# Patient Record
Sex: FEMALE | Race: AMERICAN INDIAN OR ALASKA NATIVE
[De-identification: names, ages, dates, MRNs, and addresses within clinical notes are randomized per-mention and may not be internally consistent; named-entity substitution may affect disease eponyms.]

---

## 2017-04-02 NOTE — XRAY REPORT
FINAL REPORT



EXAM:  XR ANKLE 2V LT



HISTORY:  Injury, pain, swelling 



COMPARISON:  None available. 



FINDINGS:  

Three views of left ankle obtained. Oblique nondisplaced fracture

of the distal fibular metaphysis and diaphysis. 1 millimeter

avulsive fracture fragment the inferior margin the medial

malleolus. Prominent soft tissue swelling along the lateral

malleolus. Ankle mortise is preserved. 



IMPRESSION:  

Oblique nondisplaced fracture of the distal fibula. 1 millimeter

fracture fragment at the inferior margin of the medial malleolus.

## 2017-04-02 NOTE — EMERGENCY DEPARTMENT REPORT
HPI





- General


Chief Complaint: Extremity Injury, Lower


Time Seen by Provider: 04/02/17 19:56





- HPI


HPI: 





21-year-old -American female comes in for left ankle pain.  Patient 

states that she slipped and injured her left ankle she states she heard a 

cracking noise the injury happened last night.  She does report that she iced 

it and take ibuprofen and elevated it last night.  She has no past medical 

history she currently is on no medication and she has a Mirena placed.  Last 

menstrual period was 03/20/2017.





ED Past Medical Hx





- Past Medical History


Previous Medical History?: Yes


Additional medical history: Pregnancy





- Surgical History


Past Surgical History?: No





- Social History


Smoking Status: Current Every Day Smoker


Substance Use Type: None





- Medications


Home Medications: 


 Home Medications











 Medication  Instructions  Recorded  Confirmed  Last Taken  Type


 


Famotidine [Pepcid] 20 mg PO BID #60 tablet 07/03/16  Unknown Rx


 


Prenatal Vit No.130/Iron/FA 1 tab PO DAILY 07/03/16 07/03/16 11/08/16 History





[Prenatal Tablet]     


 


Oxycodone HCl/Acetaminophen 1 each PO Q6HR PRN #12 tablet 04/02/17  Unknown Rx





[Percocet 7.5/325 mg]     














ED Review of Systems


ROS: 


Stated complaint: LEFT ANKLE SWOLLEN


Other details as noted in HPI





Musculoskeletal: joint swelling, arthralgia





Physical Exam





- Physical Exam


Vital Signs: 


 Vital Signs











  04/02/17





  17:51


 


Temperature 99.1 F


 


Pulse Rate 100 H


 


Respiratory 20





Rate 


 


Blood Pressure 143/76


 


O2 Sat by Pulse 100





Oximetry 











Physical Exam: 





GENERAL: Alert and oriented x3, no apparent distress, Normal Gait, atraumatic. 


HEAD: Head is normocephalic and a-traumatic. 


EYES: Extra ocular muscles are intact. Pupils are equal, round, and reactive to 

light and accommodation. 


EXTREMITIES/MUSCULOSKELETAL: No cyanosis, clubbing, rash, lesions or edema. 

Full ROM bilaterally. UE/LE Pulses 2+ bilaterally. LE and UE 5+ strength 

bilaterally left leg edema to the lateral and posterior malleolus.  Tender to 

palpate, decreased range of motion secondary to pain.  Capillary refill is 

intact less than 2 seconds.


NEUROLOGIC: No focal Deficit, Cranial nerves II through XII are grossly intact. 

No loss of sensation, No facial droop, 


PSYCHIATRIC: Mood is congruent with affect, denies suicidal or homicidal 

ideations. 


SKIN: Warm and dry, No lesions, No ulceration or induration present








ED Course


 Vital Signs











  04/02/17





  17:51


 


Temperature 99.1 F


 


Pulse Rate 100 H


 


Respiratory 20





Rate 


 


Blood Pressure 143/76


 


O2 Sat by Pulse 100





Oximetry 














ED Medical Decision Making





- Medical Decision Making





Patient seen and evaluated by this provider fast track.  Given patient a 

Percocet 2 tablets by mouth now.  She has fractured her fibula as a oblique 

nondisplaced fracture of the distal tubular is a 1 mm fracture fragment at the 

inferior margin of the medial malleolus.  The patient a posterior splint and 

refer her to orthopedics and would give her pain management.  Patient 

verbalized understanding


Critical care attestation.: 


If time is entered above; I have spent that time in minutes in the direct care 

of this critically ill patient, excluding procedure time.








ED Disposition


Clinical Impression: 


 Fracture, fibula, proximal, Fracture of malleolus of left ankle





Disposition: DISCHARGED TO HOME OR SELFCARE


Is pt being admited?: No


Does the pt Need Aspirin: No


Condition: Stable


Instructions:  Ankle Fracture (ED)


Additional Instructions: 


Please take pain medication as prescribed.  Do not operate heavy machinery 

while on this medication.  Is very important for you to follow up with 

orthopedic for proper treatment of the ureter ankle.


Prescriptions: 


Oxycodone HCl/Acetaminophen [Percocet 7.5/325 mg] 1 each PO Q6HR PRN #12 tablet


 PRN Reason: Pain


Referrals: 


DOMINIC CLIFFORD MD [Staff Physician] - 3-5 Days


[facility] [Provider Group] - 3-5 Days


[facility] [Provider Group] - 3-5 Days


[facility] [place],  [Provider Group] - 3-5 Days


[facility] [Provider Group] - 3-5 Days


Forms:  Work/School Release Form(ED)

## 2017-08-21 ENCOUNTER — HOSPITAL ENCOUNTER (EMERGENCY)
Dept: HOSPITAL 5 - ED | Age: 22
Discharge: HOME | End: 2017-08-21
Payer: COMMERCIAL

## 2017-08-21 VITALS — SYSTOLIC BLOOD PRESSURE: 159 MMHG | DIASTOLIC BLOOD PRESSURE: 83 MMHG

## 2017-08-21 DIAGNOSIS — N76.0: ICD-10-CM

## 2017-08-21 DIAGNOSIS — N30.00: Primary | ICD-10-CM

## 2017-08-21 LAB
BILIRUB UR QL STRIP: (no result)
BLOOD UR QL VISUAL: (no result)
KETONES UR STRIP-MCNC: (no result) MG/DL
LEUKOCYTE ESTERASE UR QL STRIP: (no result)
MUCOUS THREADS #/AREA URNS HPF: (no result) /HPF
NITRITE UR QL STRIP: (no result)
PH UR STRIP: 7 [PH] (ref 5–7)
RBC #/AREA URNS HPF: 2 /HPF (ref 0–6)
UROBILINOGEN UR-MCNC: 2 MG/DL (ref ?–2)
WBC #/AREA URNS HPF: 1 /HPF (ref 0–6)

## 2017-08-21 PROCEDURE — 87591 N.GONORRHOEAE DNA AMP PROB: CPT

## 2017-08-21 PROCEDURE — 81001 URINALYSIS AUTO W/SCOPE: CPT

## 2017-08-21 PROCEDURE — 87210 SMEAR WET MOUNT SALINE/INK: CPT

## 2017-08-21 PROCEDURE — 81025 URINE PREGNANCY TEST: CPT

## 2017-08-21 NOTE — EMERGENCY DEPARTMENT REPORT
ED Female  HPI





- General


Chief complaint: Urogenital-Female


Stated complaint: ABD PAIN/BIRTH CONTROL


Time Seen by Provider: 08/21/17 17:57


Source: patient


Mode of arrival: Ambulatory


Limitations: No Limitations





- History of Present Illness


Initial comments: 





Patient is a 21-year-old female presents to the ED complaining of vaginal 

discharge with foul odor times one week.  Patient describes discharge as white, 

creamy consistency. She denies any vaginal lesions or pain. She states last 

menstrual period was last month.  She also states that her Mirena strings is 

causing her discomfort and causing her pain in her vaginal area patient states 

she can feel it would service dates painful.  Patient states she is urinary and 

since the beginning of the year.  Patient states he was placed in January.  

Patient states like to have it removed because it is causing her pain. patient 

states she does not have OB/GYN at this point.


Patient denies any fever, chills, nausea or vomiting vaginal itching, dysuria, 

vaginal bleeding.


MD Complaint: vaginal discharge, dysuria





- Related Data


 Home Medications











 Medication  Instructions  Recorded  Confirmed  Last Taken


 


Prenatal Vit No.130/Iron/FA 1 tab PO DAILY 07/03/16 07/03/16 11/08/16





[Prenatal Tablet]    








 Previous Rx's











 Medication  Instructions  Recorded  Last Taken  Type


 


Famotidine [Pepcid] 20 mg PO BID #60 tablet 07/03/16 Unknown Rx


 


Oxycodone HCl/Acetaminophen 1 each PO Q6HR PRN #12 tablet 04/02/17 Unknown Rx





[Percocet 7.5/325 mg]    


 


Norgestimate-Ethinyl Estradiol 1 each PO DAILY #28 tablet 08/21/17 Unknown Rx





[Sprintec 28 Day Tablet]    


 


Phenazopyridine [Pyridium] 200 mg PO BID #10 tab 08/21/17 Unknown Rx


 


Sulfamethoxazole/Trimethoprim 1 each PO BID #14 tablet 08/21/17 Unknown Rx





[Bactrim DS TAB]    











 Allergies











Allergy/AdvReac Type Severity Reaction Status Date / Time


 


No Known Allergies Allergy   Unverified 01/16/16 17:46














ED Review of Systems


ROS: 


Stated complaint: ABD PAIN/BIRTH CONTROL


Other details as noted in HPI





Constitutional: denies: chills, fever


Eyes: denies: eye pain, eye discharge, vision change


ENT: denies: ear pain, throat pain


Respiratory: denies: cough, shortness of breath, wheezing


Cardiovascular: denies: chest pain, palpitations


Endocrine: no symptoms reported


Gastrointestinal: denies: abdominal pain, nausea, diarrhea


Genitourinary: denies: urgency, dysuria, discharge


Musculoskeletal: denies: back pain, joint swelling, arthralgia


Skin: denies: rash, lesions


Neurological: denies: headache, weakness, paresthesias


Psychiatric: denies: anxiety, depression


Hematological/Lymphatic: denies: easy bleeding, easy bruising





ED Past Medical Hx





- Past Medical History


Previous Medical History?: No


Additional medical history: Pregnancy





- Surgical History


Past Surgical History?: No





- Social History


Smoking Status: Never Smoker


Substance Use Type: None





- Medications


Home Medications: 


 Home Medications











 Medication  Instructions  Recorded  Confirmed  Last Taken  Type


 


Famotidine [Pepcid] 20 mg PO BID #60 tablet 07/03/16  Unknown Rx


 


Prenatal Vit No.130/Iron/FA 1 tab PO DAILY 07/03/16 07/03/16 11/08/16 History





[Prenatal Tablet]     


 


Oxycodone HCl/Acetaminophen 1 each PO Q6HR PRN #12 tablet 04/02/17  Unknown Rx





[Percocet 7.5/325 mg]     


 


Norgestimate-Ethinyl Estradiol 1 each PO DAILY #28 tablet 08/21/17  Unknown Rx





[Sprintec 28 Day Tablet]     


 


Phenazopyridine [Pyridium] 200 mg PO BID #10 tab 08/21/17  Unknown Rx


 


Sulfamethoxazole/Trimethoprim 1 each PO BID #14 tablet 08/21/17  Unknown Rx





[Bactrim DS TAB]     














ED Physical Exam





- General


Limitations: No Limitations


General appearance: alert, in no apparent distress





- Head


Head exam: Present: atraumatic, normocephalic





- Eye


Eye exam: Present: normal appearance





- ENT


ENT exam: Present: mucous membranes moist





- Neck


Neck exam: Present: normal inspection





- Respiratory


Respiratory exam: Present: normal lung sounds bilaterally.  Absent: respiratory 

distress





- Cardiovascular


Cardiovascular Exam: Present: regular rate, normal rhythm.  Absent: systolic 

murmur, diastolic murmur, rubs, gallop





- GI/Abdominal


GI/Abdominal exam: Present: soft, normal bowel sounds





- 


External exam: Present: normal external exam.  Absent: erythema, swelling, 

lesions, lacerations, bleeding


Speculum exam: Present: normal speculum exam, vaginal discharge, other (Mirena 

strings visualized and mirena removed without any complications per pt request)

.  Absent: vaginal bleeding, foreign body, laceration


Bi-manual exam: Present: normal bi-manual exam.  Absent: cervical motion 

tendernes





- Extremities Exam


Extremities exam: Present: normal inspection





- Back Exam


Back exam: Present: normal inspection





- Neurological Exam


Neurological exam: Present: alert, oriented X3





- Psychiatric


Psychiatric exam: Present: normal affect, normal mood





- Skin


Skin exam: Present: warm, dry, intact, normal color.  Absent: rash





ED Course


 Vital Signs











  08/21/17 08/21/17





  17:59 22:58


 


Temperature 98.6 F 


 


Pulse Rate 90 80


 


Respiratory 16 18





Rate  


 


Blood Pressure 159/102 


 


Blood Pressure  159/83





[Left]  


 


O2 Sat by Pulse 100 100





Oximetry  














ED Medical Decision Making





- Medical Decision Making


21-year-old female presents with UTI/bacterial vaginosis


ED course: Urinalysis, wet prep, Chlamydia and gonorrhea sent.


Urinalysis positive for leukorrhea


Discussed findings with patient.


Discussed the patient needs to follow up with gynecologist for further STD 

testing as well as a normal screening


Discussed the patient results for chlamydia and gonorrhea will not come back in 

3 days for results.


Vital signs are normal patient is in no acute distress


Discussed STDs not excluded and need to follow-up for the results.


Discuss daily conscious her to use of Sprintec for coverage since urine was 

removed today.


Patient is alert and oriented 3 show no signs of instructions given and states 

will follow-up.








Critical care attestation.: 


If time is entered above; I have spent that time in minutes in the direct care 

of this critically ill patient, excluding procedure time.








ED Disposition


Clinical Impression: 


 Bacterial vaginosis





UTI (urinary tract infection)


Qualifiers:


 Urinary tract infection type: acute cystitis Hematuria presence: without 

hematuria Qualified Code(s): N30.00 - Acute cystitis without hematuria





Disposition: DC-01 TO HOME OR SELFCARE


Is pt being admited?: No


Does the pt Need Aspirin: No


Condition: Stable


Instructions:  Bacterial Vaginosis (ED), Urinary Tract Infection in Women (ED)


Prescriptions: 


Norgestimate-Ethinyl Estradiol [Sprintec 28 Day Tablet] 1 each PO DAILY #28 

tablet


Phenazopyridine [Pyridium] 200 mg PO BID #10 tab


Sulfamethoxazole/Trimethoprim [Bactrim DS TAB] 1 each PO BID #14 tablet


Referrals: 


PRIMARY CARE,MD [Primary Care Provider] - 3-5 Days


LORI SHAFER MD [Referring] - 3-5 Days


[facility] [Outside] - 3-5 Days


Forms:  Accompanied Note, STI Treatment and Prevention, Work/School Release Form

(ED)


Time of Disposition: 22:33

## 2017-08-21 NOTE — EMERGENCY DEPARTMENT REPORT
Chief Complaint: Abdominal Pain


Stated Complaint: ABD PAIN/BIRTH CONTROL


Time Seen by Provider: 08/21/17 17:57





- HPI


History of Present Illness: 





PT c/o abd pain x 2 weeks.  PT states she thinks it is due to her Mirena.  PT 

states she has had the Mirena in place for 9 months. 





- ROS


Review of Systems: 





+ vaginal discharge- creamy, white 





- Exam


Physical Exam: 





obese pt


steady gait 


gu exam not done in triage 


MSE screening note: 


Focused history and physical exam performed.


Due to findings the following was ordered:





labs 





ED Disposition for MSE


Condition: Stable

## 2017-09-08 ENCOUNTER — HOSPITAL ENCOUNTER (EMERGENCY)
Dept: HOSPITAL 5 - ED | Age: 22
LOS: 1 days | Discharge: HOME | End: 2017-09-09
Payer: COMMERCIAL

## 2017-09-08 DIAGNOSIS — M25.572: Primary | ICD-10-CM
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
## 2017-09-08 DIAGNOSIS — M54.2: ICD-10-CM

## 2017-09-08 PROCEDURE — 99284 EMERGENCY DEPT VISIT MOD MDM: CPT

## 2017-09-08 PROCEDURE — 36415 COLL VENOUS BLD VENIPUNCTURE: CPT

## 2017-09-08 PROCEDURE — 84703 CHORIONIC GONADOTROPIN ASSAY: CPT

## 2017-09-08 PROCEDURE — 72040 X-RAY EXAM NECK SPINE 2-3 VW: CPT

## 2017-09-09 VITALS — DIASTOLIC BLOOD PRESSURE: 94 MMHG | SYSTOLIC BLOOD PRESSURE: 153 MMHG

## 2017-09-09 NOTE — EMERGENCY DEPARTMENT REPORT
ED Fall HPI





- General


Chief Complaint: Fall


Stated Complaint: FELL DOWN ON THE FLOOR


Time Seen by Provider: 09/09/17 02:41


Source: patient


Mode of arrival: Ambulatory





- History of Present Illness


Initial Comments: 


21-year-old female past medical history obesity presents with complaint of left 

ankle pain and neck pain status post mechanical fall while shopping in store 

this evening.  Patient states that she was shopping in a store and states that 

she slipped on an area of the floor that she claims was slippery in store.  

Patient states she fell backwards denies any loss of consciousness denies 

sustaining any lacerations.  Patient states she had to be helped up by 

bystanders.  Brought into the hospital for evaluation by boyfriend.  Patient is 

awake alert and oriented 3 not in acute distress denies chest pain shortness 

of breath nausea vomiting blurry vision and headache.  Fully lucid, cooperative

, calm.  States her left ankle is aching.  It's that the back of her neck feels 

sore.  Denies any other injuries.


MD Complaint: fall


-: Last night


Fall From: standing


Fall Witnessed: yes, by bystander


Place Fall Occurred: other (states it occurred in a store)


Loss of Consciousness: none


Prolonged Down Time?: no, minute(s) (1)


Symptoms Prior to Fall: none


Location - Extremities: Left: Ankle


Severity: mild


Severity scale (0 -10): 4


Quality: aching


Context: tripped/slipped


Associated Symptoms: denies





- Related Data


 Home Medications











 Medication  Instructions  Recorded  Confirmed  Last Taken


 


Prenatal Vit No.130/Iron/FA 1 tab PO DAILY 07/03/16 07/03/16 11/08/16





[Prenatal Tablet]    








 Previous Rx's











 Medication  Instructions  Recorded  Last Taken  Type


 


Famotidine [Pepcid] 20 mg PO BID #60 tablet 07/03/16 Unknown Rx


 


Oxycodone HCl/Acetaminophen 1 each PO Q6HR PRN #12 tablet 04/02/17 Unknown Rx





[Percocet 7.5/325 mg]    


 


Norgestimate-Ethinyl Estradiol 1 each PO DAILY #28 tablet 08/21/17 Unknown Rx





[Sprintec 28 Day Tablet]    


 


Phenazopyridine [Pyridium] 200 mg PO BID #10 tab 08/21/17 Unknown Rx


 


Sulfamethoxazole/Trimethoprim 1 each PO BID #14 tablet 08/21/17 Unknown Rx





[Bactrim DS TAB]    


 


Naproxen [Naprosyn TAB] 500 mg PO BID PRN #25 tablet 09/09/17 Unknown Rx











 Allergies











Allergy/AdvReac Type Severity Reaction Status Date / Time


 


No Known Allergies Allergy   Unverified 01/16/16 17:46














ED Review of Systems


ROS: 


Stated complaint: FELL DOWN ON THE FLOOR


Other details as noted in HPI





Constitutional: denies: chills, fever


Eyes: denies: eye pain, eye discharge, vision change


ENT: denies: ear pain, throat pain


Respiratory: denies: cough, shortness of breath, wheezing


Cardiovascular: denies: chest pain, palpitations


Endocrine: no symptoms reported


Gastrointestinal: denies: abdominal pain, nausea, diarrhea


Genitourinary: denies: urgency, dysuria, discharge


Musculoskeletal: denies: back pain, joint swelling, arthralgia


Skin: denies: rash, lesions


Neurological: denies: headache, weakness, paresthesias


Psychiatric: denies: anxiety, depression


Hematological/Lymphatic: denies: easy bleeding, easy bruising





ED Past Medical Hx





- Past Medical History


Previous Medical History?: No


Additional medical history: Pregnancy





- Surgical History


Past Surgical History?: No





- Social History


Smoking Status: Never Smoker


Substance Use Type: None





- Medications


Home Medications: 


 Home Medications











 Medication  Instructions  Recorded  Confirmed  Last Taken  Type


 


Famotidine [Pepcid] 20 mg PO BID #60 tablet 07/03/16  Unknown Rx


 


Prenatal Vit No.130/Iron/FA 1 tab PO DAILY 07/03/16 07/03/16 11/08/16 History





[Prenatal Tablet]     


 


Oxycodone HCl/Acetaminophen 1 each PO Q6HR PRN #12 tablet 04/02/17  Unknown Rx





[Percocet 7.5/325 mg]     


 


Norgestimate-Ethinyl Estradiol 1 each PO DAILY #28 tablet 08/21/17  Unknown Rx





[Sprintec 28 Day Tablet]     


 


Phenazopyridine [Pyridium] 200 mg PO BID #10 tab 08/21/17  Unknown Rx


 


Sulfamethoxazole/Trimethoprim 1 each PO BID #14 tablet 08/21/17  Unknown Rx





[Bactrim DS TAB]     


 


Naproxen [Naprosyn TAB] 500 mg PO BID PRN #25 tablet 09/09/17  Unknown Rx














ED Physical Exam





- General


Limitations: No Limitations


General appearance: alert, in no apparent distress





- Head


Head exam: Present: atraumatic, normocephalic





- Eye


Eye exam: Present: normal appearance, PERRL, EOMI





- ENT


ENT exam: Present: mucous membranes moist





- Neck


Neck exam: Present: normal inspection, full ROM (neck flexion and extension 

intact)





- Respiratory


Respiratory exam: Present: normal lung sounds bilaterally.  Absent: respiratory 

distress





- Cardiovascular


Cardiovascular Exam: Present: regular rate, normal rhythm.  Absent: systolic 

murmur, diastolic murmur, rubs, gallop





- GI/Abdominal


GI/Abdominal exam: Present: soft, normal bowel sounds





- Extremities Exam


Extremities exam: Present: normal inspection





- Expanded Lower Extremity Exam


  ** Left


Lower Leg exam: Present: normal inspection, full ROM


Ankle exam: Present: normal inspection, full ROM (ankle dorsiflexion plantar 

flexion inversion eversion fully intact)


Foot/Toe exam: Present: normal inspection, full ROM


Neuro vascular tendon exam: Present: no vascular compromise (distal dorsalis 

pedis and posterior tibial pulses intact)


Gait: Positive: antalgic





  __________________________














  __________________________





 1 - Some pain on palpation here








- Back Exam


Back exam: Present: normal inspection





- Neurological Exam


Neurological exam: Present: alert, oriented X3, CN II-XII intact, normal gait





- Psychiatric


Psychiatric exam: Present: normal affect, normal mood





- Skin


Skin exam: Present: warm, dry, intact, normal color.  Absent: rash





ED Course





 Vital Signs











  09/08/17





  23:08


 


Temperature 98.5 F


 


Pulse Rate 94 H


 


Respiratory 20





Rate 


 


Blood Pressure 157/81


 


O2 Sat by Pulse 99





Oximetry 














ED Medical Decision Making





- Medical Decision Making


A/P: Fall injury, neck pain, ankle pain


1-patient is able to walk without assistance without any significant difficulty 

and cranial nerves I through XII grossly intact, patient is fully awake alert 

oriented and lucid.  Does not meet [facility] or [place] head CT rules for 

imaging C-spine/head.  X-ray ordered from triage which was negative.


2-ankle x-ray unremarkable area patient is fully ambulatory without assistance


3-naproxen when necessary, RICE therapy, Ace wrap to ankle


4-patient ambulatory upon discharge. follow up with primary care 


Critical care attestation.: 


If time is entered above; I have spent that time in minutes in the direct care 

of this critically ill patient, excluding procedure time.








ED Disposition


Clinical Impression: 


 Neck pain





Ankle pain, left


Qualifiers:


 Chronicity: acute Qualified Code(s): M25.572 - Pain in left ankle and joints 

of left foot





Fall


Qualifiers:


 Encounter type: initial encounter Qualified Code(s): W19.XXXA - Unspecified 

fall, initial encounter





Disposition: DC-01 TO HOME OR SELFCARE


Is pt being admited?: No


Does the pt Need Aspirin: No


Condition: Stable


Instructions:  RICE Therapy (ED), Musculoskeletal Pain (ED), Acute Low Back 

Pain (ED)


Prescriptions: 


Naproxen [Naprosyn TAB] 500 mg PO BID PRN #25 tablet


 PRN Reason: Pain


Referrals: 


[facility] [Provider Group] - 3-5 Days


[facility] [Provider Group] - 3-5 Days


[facility] [Provider Group] - 3-5 Days


Forms:  Accompanied Note, Work/School Release Form(ED)


Time of Disposition: 03:34

## 2017-09-09 NOTE — XRAY REPORT
FINAL REPORT



PROCEDURE:  XR SPINE CERVICAL 2-3V



TECHNIQUE:  Cervical spine radiographs, AP, lateral, and

open-mouth odontoid views. CPT 23658







HISTORY:  NECK/BACK PAIN 



COMPARISON:  No prior studies are available for comparison.



FINDINGS:  

Prevertebral soft tissues: Normal .



Alignment: There is a reversal of liver lordosis, muscle or

ligamentous strain is suspected.



Vertebral body heights/Disk spaces: Normal .



Fracture(s): None .



Facets: Normal .



Bone mineralization: Normal .







IMPRESSION:  

No acute fracture or dislocation of the cervical spine.

Straightening of the cervical lordosis most consistent with

muscle or ligamentous strain.

## 2017-09-09 NOTE — XRAY REPORT
FINAL REPORT



PROCEDURE:  XR ANKLE 2V LT



TECHNIQUE:  LEFT ankle radiographs, AP and lateral views. 



HISTORY:  LEFT ANKLE PAIN 



COMPARISON:  04/02/2017



FINDINGS:  

Fracture (s) and/or Dislocation(s): No acute fracture is

identified. Minimal osseous remodeling of the distal fibula from

previous fracture.



Alignment: Normal.



Joint space(s): Normal.



Soft tissues: Normal.



Bone mineralization: Normal.



Foreign bodies: Normal.



Calcaneal spurring: Normal.







IMPRESSION:  

No evidence of an acute fracture or dislocation..

## 2017-10-14 ENCOUNTER — HOSPITAL ENCOUNTER (EMERGENCY)
Dept: HOSPITAL 5 - ED | Age: 22
LOS: 1 days | Discharge: LEFT BEFORE DISCHARGE | End: 2017-10-15
Payer: COMMERCIAL

## 2017-10-14 DIAGNOSIS — M54.5: Primary | ICD-10-CM

## 2017-10-14 DIAGNOSIS — Z53.21: ICD-10-CM

## 2017-10-14 PROCEDURE — 84702 CHORIONIC GONADOTROPIN TEST: CPT

## 2017-10-14 PROCEDURE — 81001 URINALYSIS AUTO W/SCOPE: CPT

## 2017-10-14 PROCEDURE — 36415 COLL VENOUS BLD VENIPUNCTURE: CPT

## 2017-10-14 PROCEDURE — 81025 URINE PREGNANCY TEST: CPT

## 2017-10-15 VITALS — SYSTOLIC BLOOD PRESSURE: 148 MMHG | DIASTOLIC BLOOD PRESSURE: 87 MMHG

## 2017-10-15 LAB
BACTERIA #/AREA URNS HPF: (no result) /HPF
BILIRUB UR QL STRIP: (no result)
BLOOD UR QL VISUAL: (no result)
KETONES UR STRIP-MCNC: (no result) MG/DL
LEUKOCYTE ESTERASE UR QL STRIP: (no result)
MUCOUS THREADS #/AREA URNS HPF: (no result) /HPF
NITRITE UR QL STRIP: (no result)
PH UR STRIP: 6 [PH] (ref 5–7)
PROT UR STRIP-MCNC: (no result) MG/DL
RBC #/AREA URNS HPF: 7 /HPF (ref 0–6)
UROBILINOGEN UR-MCNC: < 2 MG/DL (ref ?–2)
WBC #/AREA URNS HPF: 7 /HPF (ref 0–6)

## 2017-11-20 ENCOUNTER — HOSPITAL ENCOUNTER (EMERGENCY)
Dept: HOSPITAL 5 - ED | Age: 22
Discharge: HOME | End: 2017-11-20
Payer: MEDICAID

## 2017-11-20 VITALS — SYSTOLIC BLOOD PRESSURE: 112 MMHG | DIASTOLIC BLOOD PRESSURE: 65 MMHG

## 2017-11-20 DIAGNOSIS — O26.891: Primary | ICD-10-CM

## 2017-11-20 DIAGNOSIS — Z3A.11: ICD-10-CM

## 2017-11-20 DIAGNOSIS — N89.8: ICD-10-CM

## 2017-11-20 LAB
ANION GAP SERPL CALC-SCNC: 18 MMOL/L
BACTERIA #/AREA URNS HPF: (no result) /HPF
BILIRUB UR QL STRIP: (no result)
BLOOD UR QL VISUAL: (no result)
BUN SERPL-MCNC: 6 MG/DL (ref 7–17)
BUN/CREAT SERPL: 15 %
CALCIUM SERPL-MCNC: 9.2 MG/DL (ref 8.4–10.2)
CHLORIDE SERPL-SCNC: 100.8 MMOL/L (ref 98–107)
CO2 SERPL-SCNC: 25 MMOL/L (ref 22–30)
GLUCOSE SERPL-MCNC: 96 MG/DL (ref 65–100)
HCT VFR BLD CALC: 37.3 % (ref 30.3–42.9)
HGB BLD-MCNC: 12.3 GM/DL (ref 10.1–14.3)
KETONES UR STRIP-MCNC: (no result) MG/DL
LEUKOCYTE ESTERASE UR QL STRIP: (no result)
MCH RBC QN AUTO: 29 PG (ref 28–32)
MCHC RBC AUTO-ENTMCNC: 33 % (ref 30–34)
MCV RBC AUTO: 89 FL (ref 79–97)
MUCOUS THREADS #/AREA URNS HPF: (no result) /HPF
NITRITE UR QL STRIP: (no result)
PH UR STRIP: 6 [PH] (ref 5–7)
PLATELET # BLD: 330 K/MM3 (ref 140–440)
POTASSIUM SERPL-SCNC: 3.6 MMOL/L (ref 3.6–5)
RBC # BLD AUTO: 4.21 M/MM3 (ref 3.65–5.03)
RBC #/AREA URNS HPF: 9 /HPF (ref 0–6)
SODIUM SERPL-SCNC: 140 MMOL/L (ref 137–145)
UROBILINOGEN UR-MCNC: 2 MG/DL (ref ?–2)
WBC # BLD AUTO: 8.4 K/MM3 (ref 4.5–11)
WBC #/AREA URNS HPF: 5 /HPF (ref 0–6)

## 2017-11-20 PROCEDURE — 85027 COMPLETE CBC AUTOMATED: CPT

## 2017-11-20 PROCEDURE — 76802 OB US < 14 WKS ADDL FETUS: CPT

## 2017-11-20 PROCEDURE — 80048 BASIC METABOLIC PNL TOTAL CA: CPT

## 2017-11-20 PROCEDURE — 84702 CHORIONIC GONADOTROPIN TEST: CPT

## 2017-11-20 PROCEDURE — 81001 URINALYSIS AUTO W/SCOPE: CPT

## 2017-11-20 PROCEDURE — 86900 BLOOD TYPING SEROLOGIC ABO: CPT

## 2017-11-20 PROCEDURE — 76801 OB US < 14 WKS SINGLE FETUS: CPT

## 2017-11-20 PROCEDURE — 36415 COLL VENOUS BLD VENIPUNCTURE: CPT

## 2017-11-20 PROCEDURE — 86901 BLOOD TYPING SEROLOGIC RH(D): CPT

## 2017-11-20 PROCEDURE — 76817 TRANSVAGINAL US OBSTETRIC: CPT

## 2017-11-20 NOTE — ULTRASOUND REPORT
FINAL REPORT



EXAM:  US OB &lt; = 14 WK FETUS ADD GEST



HISTORY:  16 weeks pregnant with no fetal movement felt by the

mother .  LMP 09/22/2017 with estimated age 8 weeks 3 days and

EDC 06/29/2018. A serum beta HCG quantitation [id number] 



TECHNIQUE:  Ultrasound of the pelvis using transabdominal and

transvaginal imaging 



PRIORS:  None.



FINDINGS:  

Uterus: Uterus is enlarged in size and normal and homogeneous in

echogenicity without focal fibroid formation. The uterus measures

12.9 x 6.5 x 7.3 cm in size. There is a single early viable

intrauterine gestation noted. 



Intrauterine gestation: There is a single intrauterine gestation

identified with both a fetal pole and yolk sac. Fetal heart rate

is monitored at 163 BPM using M-mode doppler. Crown-rump length

measurement of 4.26 cm corresponds to estimated age 11 weeks 1

days with EDC 06/10/2018. Placenta is located anteriorly. 



Ovaries: Neither ovary is visualized. 



Other: There is no evidence for solid adnexal mass is seen. There

is no free fluid in the cul-de-sac. 



IMPRESSION:  

Single intrauterine viable  pregnancy with an approximate age of

11 weeks 1 days.

## 2017-11-20 NOTE — EMERGENCY DEPARTMENT REPORT
HPI





- General


Chief Complaint: Urogenital-Female


Time Seen by Provider: 17 14:36





- HPI


HPI: 


This is a 22-year-old  female presents to the emergency 

department with complaint of concern for possible miscarriage as she took some 

indomethacin earlier, that she takes for herpes simplex, and did not feel like 

the baby was moving the way it normally does.  She denies any vaginal bleeding, 

discharge, dysuria, fever, nausea, vomiting or abdominal pain.  She does have a 

little bit of pressure towards the vagina.  With this pregnancy she is .  

She is going to see [facility] but does not have an appointment 

until December.  She believes that she is about 16 weeks pregnant.  She is on 

prenatal vitamins.








ED Past Medical Hx





- Past Medical History


Additional medical history: Pregnancy,herpes





- Surgical History


Past Surgical History?: No





- Social History


Smoking Status: Never Smoker


Substance Use Type: None





- Medications


Home Medications: 


 Home Medications











 Medication  Instructions  Recorded  Confirmed  Last Taken  Type


 


Famotidine [Pepcid] 20 mg PO BID #60 tablet 16  Unknown Rx


 


Prenatal Vit No.130/Iron/Folic 1 tab PO DAILY 16 History





[Prenatal Tablet]     


 


Oxycodone HCl/Acetaminophen 1 each PO Q6HR PRN #12 tablet 17  Unknown Rx





[Percocet 7.5/325 mg]     


 


Norgestimate-Ethinyl Estradiol 1 each PO DAILY #28 tablet 17  Unknown Rx





[Sprintec 28 Day Tablet]     


 


Phenazopyridine [Pyridium] 200 mg PO BID #10 tab 17  Unknown Rx


 


Sulfamethoxazole/Trimethoprim 1 each PO BID #14 tablet 17  Unknown Rx





[Bactrim DS TAB]     


 


Naproxen [Naprosyn TAB] 500 mg PO BID PRN #25 tablet 17  Unknown Rx














ED Review of Systems


ROS: 


Stated complaint: VAGINAL PRESSURE


Other details as noted in HPI





Comment: All other systems reviewed and negative


Constitutional: denies: chills, fever


Eyes: denies: eye pain, eye discharge, vision change


ENT: denies: ear pain, throat pain


Respiratory: denies: cough, shortness of breath, wheezing


Cardiovascular: denies: chest pain, palpitations


Gastrointestinal: denies: abdominal pain, nausea, diarrhea


Genitourinary: denies: urgency, dysuria, discharge


Musculoskeletal: denies: back pain, joint swelling, arthralgia


Skin: denies: change in color, pruritus


Neurological: denies: headache, weakness, paresthesias





Physical Exam





- Physical Exam


Vital Signs: 


 Vital Signs











  17





  13:46 19:28


 


Temperature 98.3 F 97.6 F


 


Pulse Rate 86 87


 


Respiratory 18 18





Rate  


 


Blood Pressure 128/67 112/65


 


O2 Sat by Pulse 99 100





Oximetry  











Physical Exam: 





GENERAL: The patient is well-developed well-nourished.


HENT: Normocephalic.  Atraumatic.    Patient has moist mucous membranes.


EYES: Extraocular motions are intact.  Pupils equal reactive to light 

bilaterally.


NECK: Supple.  Trachea is midline.


CHEST/LUNGS: Clear to auscultation.  There is no respiratory distress noted.


HEART/CARDIOVASCULAR: Regular.  There is no tachycardia.  There is no gallop 

rub or murmur.


ABDOMEN: Abdomen is soft, nontender.  Patient has normal bowel sounds.  Obese 

habitus.


SKIN: Skin is warm and dry.


NEURO: The patient is awake, alert, and oriented.  The patient is cooperative.  

The patient has no focal neurologic deficits.  The patient has normal speech.


MUSCULOSKELETAL: There is no tenderness or deformity.  There is no limitation 

range of motion.  There is no evidence of acute injury.





ED Course


 Vital Signs











  17





  13:46 19:28


 


Temperature 98.3 F 97.6 F


 


Pulse Rate 86 87


 


Respiratory 18 18





Rate  


 


Blood Pressure 128/67 112/65


 


O2 Sat by Pulse 99 100





Oximetry  














ED Medical Decision Making





- Lab Data


Result diagrams: 


 17 13:59





 17 13:59





- Radiology Data


Radiology results: report reviewed





EXAM: US OB TRANSVAGINAL 





HISTORY: 16 weeks pregnant with no fetal movement felt by the 


mother . LMP 2017 with estimated age 8 weeks 3 days and 


EDC 2018. A serum beta HCG quantitation [id number] 





TECHNIQUE: Ultrasound of the pelvis using transabdominal and 


transvaginal imaging 





PRIORS: None. 





FINDINGS: 


Uterus: Uterus is enlarged in size and normal and homogeneous in 


echogenicity without focal fibroid formation. The uterus measures 


12.9 x 6.5 x 7.3 cm in size. There is a single early viable 


intrauterine gestation noted. 





Intrauterine gestation: There is a single intrauterine gestation 


identified with both a fetal pole and yolk sac. Fetal heart rate 


is monitored at 163 BPM using M-mode doppler. Crown-rump length 


measurement of 4.26 cm corresponds to estimated age 11 weeks 1 


days with EDC 06/10/2018. Placenta is located anteriorly. 





Ovaries: Neither ovary is visualized. 





Other: There is no evidence for solid adnexal mass is seen. There 


is no free fluid in the cul-de-sac. 





IMPRESSION: 


Single intrauterine viable pregnancy with an approximate age of 


11 weeks 1 days. 











Transcribed By: [facility] 


Dictated By: SARAH SIDHU MD 


Electronically Authenticated By: SARAH SIDHU MD 


Signed Date/Time: 17 6157 





- Medical Decision Making


Patient presented after she was concerned about the pregnancy and she did not 

feel fetal movement that she says that she feels regularly and was concerned 

that she had some reaction to taking indomethacin.  Labs have been 

unremarkable.  Ultrasound shows a live intrauterine pregnancy at 11 weeks.  

There is no vaginal bleeding.  Vital signs stable.  She will follow up with her 

OB/GYN and return to the ER with any worsening of her symptoms or any acute 

distress.  She will hold off on taking anything but Tylenol for discomfort 

until follow-up with OB.








- Differential Diagnosis


pregnancy, threatened miscarriage, spontaneous miscarriage, fibroids


Critical Care Time: No


Critical care attestation.: 


If time is entered above; I have spent that time in minutes in the direct care 

of this critically ill patient, excluding procedure time.








ED Disposition


Clinical Impression: 


Pregnant


Qualifiers:


 Weeks of gestation: 11 weeks Qualified Code(s): Z3A.11 - 11 weeks gestation of 

pregnancy





Disposition: DC-01 TO HOME OR SELFCARE


Is pt being admited?: No


Condition: Stable


Instructions:  Pregnancy (ED)


Additional Instructions: 


Please follow-up with your OB/GYN as soon as possible.  Return to the emergency 

Department with any worsening of her symptoms, vaginal bleeding, sharp 

abdominal pains, or any acute distress.  Continue with your prenatal vitamins.


Referrals: 


PRIMARY MD SAHIL [Primary Care Provider] - 3-5 Days


Forms:  Accompanied Note


Time of Disposition: 21:34

## 2017-11-20 NOTE — ULTRASOUND REPORT
FINAL REPORT



EXAM:  US OB TRANSVAGINAL



HISTORY:  16 weeks pregnant with no fetal movement felt by the

mother .  LMP 09/22/2017 with estimated age 8 weeks 3 days and

EDC 06/29/2018. A serum beta HCG quantitation [id number] 



TECHNIQUE:  Ultrasound of the pelvis using transabdominal and

transvaginal imaging 



PRIORS:  None.



FINDINGS:  

Uterus: Uterus is enlarged in size and normal and homogeneous in

echogenicity without focal fibroid formation. The uterus measures

12.9 x 6.5 x 7.3 cm in size. There is a single early viable

intrauterine gestation noted. 



Intrauterine gestation: There is a single intrauterine gestation

identified with both a fetal pole and yolk sac. Fetal heart rate

is monitored at 163 BPM using M-mode doppler. Crown-rump length

measurement of 4.26 cm corresponds to estimated age 11 weeks 1

days with EDC 06/10/2018. Placenta is located anteriorly. 



Ovaries: Neither ovary is visualized. 



Other: There is no evidence for solid adnexal mass is seen. There

is no free fluid in the cul-de-sac. 



IMPRESSION:  

Single intrauterine viable  pregnancy with an approximate age of

11 weeks 1 days.

## 2017-11-20 NOTE — EMERGENCY DEPARTMENT REPORT
Chief Complaint: Urogenital-Female


Stated Complaint: VAGINAL PRESSURE


Time Seen by Provider: 11/20/17 14:36





- HPI


History of Present Illness: 





This is a 22-year-old female who reports that she is about 16 weeks pregnant 

with no prenatal care.  She says she is concerned because she thinks that she 

has lost the baby.  She says she thought she lost the baby because she did not 

have any movement and she did before.  She says she has vaginal herpes and took 

acyclovir and she doesn't know if the medication is safe.  She says she's had 

been vaginal pain on the inside of her vagina and denies trauma.  Denies any 

vaginal bleeding or discharge.  Pain is 7 out of 10.  She said she took some 

ice acyclovir recently for herpes break out and she says she thinks she is 

having a little breakout now.  She denies any abdominal pain





- ROS


Review of Systems: 





All systems are negative unless stated in HPI above





- Exam


Vital Signs: 


 Vital Signs











  11/20/17





  13:46


 


Temperature 98.3 F


 


Pulse Rate 86


 


Respiratory 18





Rate 


 


Blood Pressure 128/67


 


O2 Sat by Pulse 99





Oximetry 











Physical Exam: 





Gen.: This is a 22-year-old female well-nourished well-developed in no acute 

distress


Abdomen: No acute abdomen, no rigidity .  Normal bowel sounds in all quadrants.


MSE screening note: 


Focused history and physical exam performed.


Due to findings the following was ordered:











ED Medical Decision Making





- Lab Data


Result diagrams: 


 11/20/17 13:59





 11/20/17 13:59





- Medical Decision Making





MDM: Patient screened by provider in triage area.  Appropriate protocol 

initiated and patient to be seen in main ED by 





ED Disposition for MSE


Condition: Stable

## 2018-06-07 ENCOUNTER — HOSPITAL ENCOUNTER (INPATIENT)
Dept: HOSPITAL 5 - TRG | Age: 23
LOS: 2 days | Discharge: HOME | End: 2018-06-09
Attending: OBSTETRICS & GYNECOLOGY | Admitting: OBSTETRICS & GYNECOLOGY
Payer: COMMERCIAL

## 2018-06-07 ENCOUNTER — HOSPITAL ENCOUNTER (OUTPATIENT)
Dept: HOSPITAL 5 - TRG | Age: 23
Discharge: HOME | End: 2018-06-07
Attending: OBSTETRICS & GYNECOLOGY
Payer: COMMERCIAL

## 2018-06-07 VITALS — DIASTOLIC BLOOD PRESSURE: 67 MMHG | SYSTOLIC BLOOD PRESSURE: 127 MMHG

## 2018-06-07 DIAGNOSIS — E66.9: ICD-10-CM

## 2018-06-07 DIAGNOSIS — O36.5930: Primary | ICD-10-CM

## 2018-06-07 DIAGNOSIS — Z23: ICD-10-CM

## 2018-06-07 DIAGNOSIS — A60.00: ICD-10-CM

## 2018-06-07 DIAGNOSIS — O47.1: Primary | ICD-10-CM

## 2018-06-07 DIAGNOSIS — Z3A.39: ICD-10-CM

## 2018-06-07 LAB
BASOPHILS # (AUTO): 0 K/MM3 (ref 0–0.1)
BASOPHILS NFR BLD AUTO: 0.1 % (ref 0–1.8)
EOSINOPHIL # BLD AUTO: 0 K/MM3 (ref 0–0.4)
EOSINOPHIL NFR BLD AUTO: 0.4 % (ref 0–4.3)
HCT VFR BLD CALC: 33.6 % (ref 30.3–42.9)
HGB BLD-MCNC: 11.3 GM/DL (ref 10.1–14.3)
LYMPHOCYTES # BLD AUTO: 1.7 K/MM3 (ref 1.2–5.4)
LYMPHOCYTES NFR BLD AUTO: 14.3 % (ref 13.4–35)
MCH RBC QN AUTO: 30 PG (ref 28–32)
MCHC RBC AUTO-ENTMCNC: 34 % (ref 30–34)
MCV RBC AUTO: 89 FL (ref 79–97)
MONOCYTES # (AUTO): 0.7 K/MM3 (ref 0–0.8)
MONOCYTES % (AUTO): 6.3 % (ref 0–7.3)
PLATELET # BLD: 283 K/MM3 (ref 140–440)
RBC # BLD AUTO: 3.76 M/MM3 (ref 3.65–5.03)

## 2018-06-07 PROCEDURE — 10907ZC DRAINAGE OF AMNIOTIC FLUID, THERAPEUTIC FROM PRODUCTS OF CONCEPTION, VIA NATURAL OR ARTIFICIAL OPENING: ICD-10-PCS | Performed by: OBSTETRICS & GYNECOLOGY

## 2018-06-07 PROCEDURE — 85018 HEMOGLOBIN: CPT

## 2018-06-07 PROCEDURE — 59025 FETAL NON-STRESS TEST: CPT

## 2018-06-07 PROCEDURE — 86592 SYPHILIS TEST NON-TREP QUAL: CPT

## 2018-06-07 PROCEDURE — 86850 RBC ANTIBODY SCREEN: CPT

## 2018-06-07 PROCEDURE — 36415 COLL VENOUS BLD VENIPUNCTURE: CPT

## 2018-06-07 PROCEDURE — 00HU33Z INSERTION OF INFUSION DEVICE INTO SPINAL CANAL, PERCUTANEOUS APPROACH: ICD-10-PCS | Performed by: OBSTETRICS & GYNECOLOGY

## 2018-06-07 PROCEDURE — 86901 BLOOD TYPING SEROLOGIC RH(D): CPT

## 2018-06-07 PROCEDURE — 85025 COMPLETE CBC W/AUTO DIFF WBC: CPT

## 2018-06-07 PROCEDURE — 86900 BLOOD TYPING SEROLOGIC ABO: CPT

## 2018-06-07 PROCEDURE — 90471 IMMUNIZATION ADMIN: CPT

## 2018-06-07 PROCEDURE — 85014 HEMATOCRIT: CPT

## 2018-06-07 PROCEDURE — 99211 OFF/OP EST MAY X REQ PHY/QHP: CPT

## 2018-06-07 PROCEDURE — 3E0R3BZ INTRODUCTION OF ANESTHETIC AGENT INTO SPINAL CANAL, PERCUTANEOUS APPROACH: ICD-10-PCS | Performed by: OBSTETRICS & GYNECOLOGY

## 2018-06-07 PROCEDURE — 90715 TDAP VACCINE 7 YRS/> IM: CPT

## 2018-06-07 PROCEDURE — G0463 HOSPITAL OUTPT CLINIC VISIT: HCPCS

## 2018-06-07 RX ADMIN — HYDROCODONE BITARTRATE AND ACETAMINOPHEN PRN EACH: 5; 325 TABLET ORAL at 21:45

## 2018-06-07 RX ADMIN — IBUPROFEN SCH MG: 600 TABLET, FILM COATED ORAL at 20:44

## 2018-06-07 NOTE — PROCEDURE NOTE
OB Delivery Note





- Delivery


Date of Delivery: 18 (1628)


Surgeon: LEVI CARPENTER


Estimated blood loss: 200cc





- Vaginal


Delivery presentation: vertex


Delivery position: OA


Intrapartum events: none


Delivery induction: none


Delivery augmentation: rupture of membranes, pitocin


Delivery monitor: external FHT, internal uterine


Route of delivery: 


Delivery placenta: spontaneous


Delivery cord: 3 umbilical vessels


Episiotomy: none


Delivery laceration: none


Anesthesia: none


Delivery comments: 


 of a live 6'13 male infant over a intact perineum under epidural 

anesthesia with Apgars of 8 and 9 at 1628 on 2018.  Infant directly to 

maternal abd/chest, skin to skin contact.  Spontaneous delivery of placenta 

complete and intact with Somers side presenting at 1634.  Fundus is firm and 

midline located 4 below the U; Lochia is scant.  Delayed cord clamping and 

cutting; cord cut by the father of the baby.  Placenta discarded.








- Infant


  ** A


Apgar at 1 minute: 8


Apgar at 5 minutes: 9


Infant Gender: Male (6'13)

## 2018-06-07 NOTE — ANESTHESIA CONSULTATION
Anesthesia Consult and Med Hx


Date of service: 06/07/18





- Airway


Anesthetic Teeth Evaluation: Good


ROM Head & Neck: Adequate


Mental/Hyoid Distance: Adequate


Mallampati Class: Class III


Intubation Access Assessment: Possibly Difficult





- Pre-Operative Health Status


ASA Pre-Surgery Classification: ASA3


Proposed Anesthetic Plan: Epidural, Spinal





- Pulmonary


Hx Asthma: No





- Cardiovascular System


Hx Hypertension: No





- Central Nervous System


Hx Seizures: No


Hx Psychiatric Problems: No





- Endocrine


Hx Renal Disease: No


Hx Hypothyroidism: No


Hx Hyperthyroidism: No





- Hematic


Hx Anemia: No


Hx Sickle Cell Disease: No





- Other Systems


Hx Alcohol Use: No


Hx Obesity: Yes (BMI 47.6)

## 2018-06-07 NOTE — HISTORY AND PHYSICAL REPORT
History of Present Illness


Date of examination: 18


Date of admission: 


18 10:49





Chief complaint: 


Sent from [facility] today in early labor with a recommendation for delivery due to a 

decreased ERA (7.61), and Borderline IUGR (13%). 





History of present illness: 





Late entry to prenatal care; Second trimester complicated by a abnormal Quad 

Screen; followed by a low risk Panorama, 1 UTI (treated with Macrobid), Anemia, 

and Vitamin D Insufficiency. Co-managed with [facility] due to maternal obesity. Hx of 

HSV II, taking Valtex suppression.





Past History


Past Medical History: no pertinent history


Past Surgical History: no surgical history


GYN History: herpes


Family/Genetic History: diabetes, hypertension


Social history: no significant social history, single





- Obstetrical History


Expected Date of Delivery: 18


Actual Gestation: 39 Week(s) 2 Day(s) 


: 2


Para: 1


Hx # Term Pregnancies: 1


Number of Living Children: 1


  ** #1


Infant Gender: Female


Birth year: [birth year]


Birthweight: 2.863 kg


Method of Delivery: Vaginal


Gestational age at delivery: 41


Complications: none





Medications and Allergies


 Allergies











Allergy/AdvReac Type Severity Reaction Status Date / Time


 


No Known Allergies Allergy   Unverified 16 17:46











 Home Medications











 Medication  Instructions  Recorded  Confirmed  Last Taken  Type


 


Valacyclovir HCl [Valtrex] 500 mg PO DAILY 18 11:00 

History











Active Meds: 


Active Medications





Butorphanol Tartrate (Stadol)  2 mg IV Q2H PRN


   PRN Reason: Labor Pain


   Last Admin: 18 13:21 Dose:  2 mg


Ephedrine Sulfate (Ephedrine Sulfate)  10 mg IV Q2M PRN


   PRN Reason: Hypotension


Fentanyl (Sublimaze)  100 mcg IV Q2H PRN


   PRN Reason: Labor Pain


Lactated Ringer's (Lactated Ringers)  1,000 mls @ 125 mls/hr IV AS DIRECT GRACY


Oxytocin/Sodium Chloride (Pitocin/Ns 20 Unit/1000ml Drip)  20 units in 1,000 

mls @ 125 mls/hr IV AS DIRECT GRACY


Oxytocin/Sodium Chloride (Pitocin/Ns 30 Unit/500ml)  30 units in 500 mls @ 1 mls

/hr IV TITR GRACY; Protocol


Lactated Ringer's (Lactated Ringers)  1,000 mls @ 125 mls/hr IV AS DIRECT GRACY


Oxytocin/Sodium Chloride (Pitocin/Ns 30 Unit/500ml)  30 units in 500 mls @ 1 mls

/hr IV TITR GRACY; Protocol


Mineral Oil (Mineral Oil)  30 ml PO QHS PRN


   PRN Reason: Constipation


Terbutaline Sulfate (Brethine)  0.25 mg SUB-Q ONCE PRN


   PRN Reason: Hyperstimulation/Hypertonicity


Terbutaline Sulfate (Brethine)  0.25 mg IVP ONCE PRN


   PRN Reason: Hyperstimulation/Hypertonicity











Review of Systems


All systems: negative





- Vital Signs


Vital signs: 


 Vital Signs











Temp Pulse Resp BP


 


 97.6 F   78   18   117/66 


 


 18 12:33  18 12:33  18 12:33  18 12:33








 











Temp Pulse Resp BP Pulse Ox


 


 97.6 F   78   18   117/66    


 


 18 12:33  18 12:33  18 12:33  18 12:33   














- Physical Exam


Breasts: Positive: normal


Cardiovascular: Regular rate


Lungs: Positive: Clear to auscultation, Normal air movement


Abdomen: Positive: normal appearance, soft, normal bowel sounds


Genitourinary (Female): Positive: normal external genitalia, normal perenium


Vagina: Positive: normal moisture


Uterus: Positive: enlarged


Anus/Rectum: Positive: normal perianal skin





- Obstetrical


FHR: category 1


Uterine Contraction Monitor Mode: Internal


Cervical Dilatation: 7 (AROM of a moderate amount of clear fluid @ 1355)


Cervical Effacement Percentage: 80


Fetal station: -3


Uterine Contraction Pattern: Irregular


Uterine Tone Measurement Phase: Resting


Uterine Contraction Intensity: Moderate





Results


Result Diagrams: 


 18 13:23





 Abnormal lab results











  18 Range/Units





  13:23 


 


WBC  11.9 H  (4.5-11.0)  K/mm3


 


Seg Neutrophils %  78.9 H  (40.0-70.0)  %


 


Seg Neutrophils #  9.4 H  (1.8-7.7)  K/mm3








All other labs normal.








Assessment and Plan





A: IUP @ 39 2/7 Weeks


    Category I Tracing


    Decreased ERA


    Borderline IUGR


    Active Labor


    Maternal Obesity


    GBS Negative





P: Admit to L&D per Routine Orders


   AROM


   Pitocin Augmentation


   Internal x 1


   Epidural Anesthesia

## 2018-06-08 LAB
HCT VFR BLD CALC: 31.7 % (ref 30.3–42.9)
HGB BLD-MCNC: 10.4 GM/DL (ref 10.1–14.3)

## 2018-06-08 PROCEDURE — 3E0234Z INTRODUCTION OF SERUM, TOXOID AND VACCINE INTO MUSCLE, PERCUTANEOUS APPROACH: ICD-10-PCS | Performed by: OBSTETRICS & GYNECOLOGY

## 2018-06-08 RX ADMIN — HYDROCODONE BITARTRATE AND ACETAMINOPHEN PRN EACH: 5; 325 TABLET ORAL at 13:00

## 2018-06-08 RX ADMIN — HYDROCODONE BITARTRATE AND ACETAMINOPHEN PRN EACH: 5; 325 TABLET ORAL at 08:40

## 2018-06-08 RX ADMIN — HYDROCODONE BITARTRATE AND ACETAMINOPHEN PRN EACH: 5; 325 TABLET ORAL at 18:00

## 2018-06-08 RX ADMIN — IBUPROFEN SCH: 600 TABLET, FILM COATED ORAL at 12:00

## 2018-06-08 RX ADMIN — IBUPROFEN SCH MG: 600 TABLET, FILM COATED ORAL at 17:58

## 2018-06-08 RX ADMIN — HYDROCODONE BITARTRATE AND ACETAMINOPHEN PRN EACH: 5; 325 TABLET ORAL at 03:34

## 2018-06-08 RX ADMIN — IBUPROFEN SCH MG: 600 TABLET, FILM COATED ORAL at 08:42

## 2018-06-08 RX ADMIN — HYDROCODONE BITARTRATE AND ACETAMINOPHEN PRN EACH: 5; 325 TABLET ORAL at 22:18

## 2018-06-08 RX ADMIN — IBUPROFEN SCH MG: 600 TABLET, FILM COATED ORAL at 01:42

## 2018-06-08 NOTE — PROGRESS NOTE
Assessment and Plan





A: PPD#1 s/p 


    Breastfeeding


    Stable





P: Routine postpartum care


    Discharge home in am





Subjective





- Subjective


Date of service: 18


Principal diagnosis: 


Interval history: 





See H&P and delivery note


Patient reports: appetite normal, voiding normally, pain well controlled, flatus

, ambulating normally


[place]: doing well, other (Breast/Bottle)





Objective





- Vital Signs


Latest vital signs: 


 Vital Signs











  Temp Pulse Resp BP BP Pulse Ox


 


 18 05:35  98.7 F  81  20  113/78   98


 


 18 00:56  97.4 F L  77  20  119/61   98


 


 18 20:18     118/54  


 


 18 20:14  98.7 F  78  20   


 


 18 19:01   92 H    123/54 


 


 18 18:32   150 H    116/57 


 


 18 17:46  97.6 F  65    103/51 


 


 18 17:15   68    90/47 


 


 18 17:00   66    87/46 


 


 18 12:33  97.6 F  78  18   117/66 








 Intake and Output











 18





 23:59 07:59 15:59


 


Intake Total 360 480 


 


Output Total 700  


 


Balance -340 480 


 


Intake:   


 


  Oral  480 


 


  Intake, Free Water 360  


 


Output:   


 


  Urine 700  


 


    Void 700  


 


Other:   


 


  Total, Intake Amount  240 


 


  Total, Output Amount 700  


 


  # Voids   


 


    Void 1 1 


 


  Estimated Blood Loss 200  














- Exam


Breasts: Present: normal, breastfeeding


Cardiovascular: Present: Regular rate, Normal S1


Lungs: Present: Clear to auscultation, Normal air movement


Abdomen: Present: normal appearance, soft.  Absent: distention


Vulva: both: normal


Uterus: Present: firm, fundal height at umbilicus


Extremities: Present: normal


Deep Tendon Reflex Grade: Normal +2





- Labs


Labs: 


 Abnormal lab results











  18 Range/Units





  13:23 


 


WBC  11.9 H  (4.5-11.0)  K/mm3


 


Seg Neutrophils %  78.9 H  (40.0-70.0)  %


 


Seg Neutrophils #  9.4 H  (1.8-7.7)  K/mm3

## 2018-06-08 NOTE — DISCHARGE SUMMARY
Providers





- Providers


Date of Admission: 


18 10:49





Date of discharge: 18


Attending physician: 


FRANCISCO BECK MD





Primary care physician: 


FRANCISCO BECK MD








Hospitalization


Reason for admission: active labor, IUP at term


Delivery: 


Procedure details: 





See delivery note


Episiotomy: none


Laceration: none


Other postpartum procedures: none


Postpartum complications: none


Discharge diagnosis: IUP at term delivered


 baby: male


Condition at discharge: Good


Disposition: DC-01 TO HOME OR SELFCARE





Plan





- Provider Discharge Summary


Activity: routine, no sex for 6 weeks, no heavy lifting 4 weeks, no strenuous 

exercise


Diet: routine


Instructions: routine


Additional instructions: 


[]  Smoking cessation referral if applicable(refer to patient education folder 

for contact #)


[]  Refer to [facility]'s [facility] Booklet








Call your doctor immediately for:


* Fever > 100.5


* Heavy vaginal bleeding ( >1 pad per hour)


* Severe persistent headache


* Shortness of breath


* Reddened, hot, painful area to leg or breast


* Drainage or odor from incision.





* Keep incision clean and dry at all times and follow doctor's instructions 

regarding bathing/showering











- Follow up plan


Follow up: 


FRANCISCO BECK MD [Primary Care Provider] - 6 Weeks

## 2018-06-09 VITALS — SYSTOLIC BLOOD PRESSURE: 120 MMHG | DIASTOLIC BLOOD PRESSURE: 58 MMHG

## 2018-06-09 RX ADMIN — HYDROCODONE BITARTRATE AND ACETAMINOPHEN PRN EACH: 5; 325 TABLET ORAL at 09:13

## 2018-06-09 RX ADMIN — HYDROCODONE BITARTRATE AND ACETAMINOPHEN PRN EACH: 5; 325 TABLET ORAL at 01:55

## 2018-06-09 RX ADMIN — IBUPROFEN SCH MG: 600 TABLET, FILM COATED ORAL at 07:58

## 2018-06-09 RX ADMIN — IBUPROFEN SCH MG: 600 TABLET, FILM COATED ORAL at 01:54

## 2018-09-17 ENCOUNTER — HOSPITAL ENCOUNTER (EMERGENCY)
Dept: HOSPITAL 5 - ED | Age: 23
Discharge: LEFT BEFORE DISCHARGE | End: 2018-09-17
Payer: MEDICAID

## 2018-09-17 VITALS — SYSTOLIC BLOOD PRESSURE: 152 MMHG | DIASTOLIC BLOOD PRESSURE: 89 MMHG

## 2018-09-17 DIAGNOSIS — F99: Primary | ICD-10-CM

## 2018-09-17 DIAGNOSIS — Z53.21: ICD-10-CM

## 2018-09-17 LAB
BASOPHILS # (AUTO): 0.1 K/MM3 (ref 0–0.1)
BASOPHILS NFR BLD AUTO: 1 % (ref 0–1.8)
BENZODIAZEPINES SCREEN,URINE: (no result)
BILIRUB UR QL STRIP: (no result)
BLOOD UR QL VISUAL: (no result)
BUN SERPL-MCNC: 12 MG/DL (ref 7–17)
BUN/CREAT SERPL: 13 %
CALCIUM SERPL-MCNC: 9.4 MG/DL (ref 8.4–10.2)
EOSINOPHIL # BLD AUTO: 0.1 K/MM3 (ref 0–0.4)
EOSINOPHIL NFR BLD AUTO: 1.4 % (ref 0–4.3)
HCT VFR BLD CALC: 38.3 % (ref 30.3–42.9)
HEMOLYSIS INDEX: 0
HGB BLD-MCNC: 12.9 GM/DL (ref 10.1–14.3)
LYMPHOCYTES # BLD AUTO: 2.5 K/MM3 (ref 1.2–5.4)
LYMPHOCYTES NFR BLD AUTO: 22.9 % (ref 13.4–35)
MCH RBC QN AUTO: 30 PG (ref 28–32)
MCHC RBC AUTO-ENTMCNC: 34 % (ref 30–34)
MCV RBC AUTO: 90 FL (ref 79–97)
METHADONE SCREEN,URINE: (no result)
MONOCYTES # (AUTO): 0.7 K/MM3 (ref 0–0.8)
MONOCYTES % (AUTO): 6.4 % (ref 0–7.3)
MUCOUS THREADS #/AREA URNS HPF: (no result) /HPF
OPIATE SCREEN,URINE: (no result)
PH UR STRIP: 5 [PH] (ref 5–7)
PLATELET # BLD: 373 K/MM3 (ref 140–440)
RBC # BLD AUTO: 4.25 M/MM3 (ref 3.65–5.03)
RBC #/AREA URNS HPF: 3 /HPF (ref 0–6)
UROBILINOGEN UR-MCNC: 4 MG/DL (ref ?–2)
WBC #/AREA URNS HPF: 6 /HPF (ref 0–6)

## 2018-09-17 PROCEDURE — 80320 DRUG SCREEN QUANTALCOHOLS: CPT

## 2018-09-17 PROCEDURE — G0480 DRUG TEST DEF 1-7 CLASSES: HCPCS

## 2018-09-17 PROCEDURE — 80048 BASIC METABOLIC PNL TOTAL CA: CPT

## 2018-09-17 PROCEDURE — 81001 URINALYSIS AUTO W/SCOPE: CPT

## 2018-09-17 PROCEDURE — 80307 DRUG TEST PRSMV CHEM ANLYZR: CPT

## 2018-09-17 PROCEDURE — 85025 COMPLETE CBC W/AUTO DIFF WBC: CPT

## 2018-09-17 PROCEDURE — 36415 COLL VENOUS BLD VENIPUNCTURE: CPT

## 2018-09-17 PROCEDURE — 84703 CHORIONIC GONADOTROPIN ASSAY: CPT

## 2020-08-04 ENCOUNTER — HOSPITAL ENCOUNTER (EMERGENCY)
Dept: HOSPITAL 5 - ED | Age: 25
Discharge: HOME | End: 2020-08-04
Payer: SELF-PAY

## 2020-08-04 VITALS — DIASTOLIC BLOOD PRESSURE: 109 MMHG | SYSTOLIC BLOOD PRESSURE: 175 MMHG

## 2020-08-04 DIAGNOSIS — Z79.899: ICD-10-CM

## 2020-08-04 DIAGNOSIS — J03.90: Primary | ICD-10-CM

## 2020-08-04 PROCEDURE — 87430 STREP A AG IA: CPT

## 2020-08-04 PROCEDURE — 87116 MYCOBACTERIA CULTURE: CPT

## 2020-08-04 PROCEDURE — 99283 EMERGENCY DEPT VISIT LOW MDM: CPT

## 2020-08-04 PROCEDURE — 96372 THER/PROPH/DIAG INJ SC/IM: CPT

## 2020-08-04 NOTE — EMERGENCY DEPARTMENT REPORT
ED ENT HPI





- General


Chief complaint: Sore Throat


Stated complaint: POSS SWOLLEN THROAT


Time Seen by Provider: 08/04/20 07:53


Source: patient


Mode of arrival: Ambulatory


Limitations: No Limitations





- History of Present Illness


Initial comments: 





This is a 24-year-old female who presents to the ED complaining of right-sided 

throat pain and swelling for the past week.  Patient states that she is having 

pain with swallowing due to the swelling.  Patient denies any recent contact.  

Patient denies fever/cough/chills/shortness of breath chest pain or any other 

symptoms.


MD complaint: sore throat


Location: throat (rght)


Severity: moderate


Severity scale (0 -10): 5


Quality: aching


Worsens with: swallowing





- Related Data


                                Home Medications











 Medication  Instructions  Recorded  Confirmed  Last Taken


 


Valacyclovir HCl [Valtrex] 500 mg PO DAILY 06/07/18 06/07/18 06/06/18 11:00








                                  Previous Rx's











 Medication  Instructions  Recorded  Last Taken  Type


 


Amoxicillin [Trimox CAP] 500 mg PO Q8H #21 capsule 08/04/20 Unknown Rx


 


Nystas/Diphen/Xyl Visc/Mylanta 15 ml MM Q6H PRN #120 ml 08/04/20 Unknown Rx





[Magic Mouthwash]    











                                    Allergies











Allergy/AdvReac Type Severity Reaction Status Date / Time


 


No Known Allergies Allergy   Unverified 01/16/16 17:46














ED Dental HPI





- General


Chief complaint: Sore Throat


Stated complaint: POSS SWOLLEN THROAT


Time Seen by Provider: 08/04/20 07:53


Source: patient


Mode of arrival: Ambulatory


Limitations: No Limitations





- Related Data


                                Home Medications











 Medication  Instructions  Recorded  Confirmed  Last Taken


 


Valacyclovir HCl [Valtrex] 500 mg PO DAILY 06/07/18 06/07/18 06/06/18 11:00








                                  Previous Rx's











 Medication  Instructions  Recorded  Last Taken  Type


 


Amoxicillin [Trimox CAP] 500 mg PO Q8H #21 capsule 08/04/20 Unknown Rx


 


Nystas/Diphen/Xyl Visc/Mylanta 15 ml MM Q6H PRN #120 ml 08/04/20 Unknown Rx





[Magic Mouthwash]    











                                    Allergies











Allergy/AdvReac Type Severity Reaction Status Date / Time


 


No Known Allergies Allergy   Unverified 01/16/16 17:46














ED Review of Systems


ROS: 


Stated complaint: POSS SWOLLEN THROAT


Other details as noted in HPI





Comment: All other systems reviewed and negative





ED Past Medical Hx





- Past Medical History


Previous Medical History?: No


Hx Hypertension: No


Hx Diabetes: No


Hx Deep Vein Thrombosis: No


Hx Renal Disease: No


Hx Sickle Cell Disease: No


Hx Seizures: No


Hx Asthma: No


Hx HIV: No


Additional medical history: Pregnancy,herpes





- Surgical History


Past Surgical History?: No





- Social History


Smoking Status: Unknown if ever smoked





- Medications


Home Medications: 


                                Home Medications











 Medication  Instructions  Recorded  Confirmed  Last Taken  Type


 


Valacyclovir HCl [Valtrex] 500 mg PO DAILY 06/07/18 06/07/18 06/06/18 11:00 

History


 


Amoxicillin [Trimox CAP] 500 mg PO Q8H #21 capsule 08/04/20  Unknown Rx


 


Nystas/Diphen/Xyl Visc/Mylanta 15 ml MM Q6H PRN #120 ml 08/04/20  Unknown Rx





[Magic Mouthwash]     














ED Physical Exam





- General


Limitations: No Limitations


General appearance: alert, in no apparent distress





- Head


Head exam: Present: atraumatic, normocephalic





- Eye


Eye exam: Present: normal appearance





- ENT


ENT exam: Present: mucous membranes moist





- Expanded ENT Exam


  ** Expanded


Mouth exam: Present: normal external inspection


Throat exam: Positive: tonsillar erythema, tonsillomegaly.  Negative: tonsillar 

exudate, R peritonsillar mass, L peritonsillar mass





- Neck


Neck exam: Present: normal inspection, full ROM, lymphadenopathy.  Absent: t

enderness





- Respiratory


Respiratory exam: Present: normal lung sounds bilaterally.  Absent: respiratory 

distress





- Cardiovascular


Cardiovascular Exam: Present: regular rate, normal rhythm.  Absent: systolic 

murmur, diastolic murmur, rubs, gallop





- GI/Abdominal


GI/Abdominal exam: Present: soft, normal bowel sounds





- Extremities Exam


Extremities exam: Present: normal inspection





- Back Exam


Back exam: Present: normal inspection





- Neurological Exam


Neurological exam: Present: alert, oriented X3





- Psychiatric


Psychiatric exam: Present: normal affect, normal mood





- Skin


Skin exam: Present: warm, dry, intact, normal color.  Absent: rash





ED Course


                                   Vital Signs











  08/04/20





  07:04


 


Temperature 98.8 F


 


Pulse Rate 83


 


Respiratory 20





Rate 


 


Blood Pressure 175/109


 


O2 Sat by Pulse 100





Oximetry 














ED Medical Decision Making





- Medical Decision Making


 24-year-old female presents with acute pharyngitis.


ED course: Rapid strep tests ordered rapid strep test negative


Patient received 1 dose Magic mouthwash and prednisone.


Vital signs stable patient is in no acute or respiratory distress.


Discussed findings with patient about the positive strep. 


Discussed with patient follow-up with primary care physician.


Patient verbally states he understands and will comply to follow-up.


Critical care attestation.: 


If time is entered above; I have spent that time in minutes in the direct care 

of this critically ill patient, excluding procedure time.








ED Disposition


Clinical Impression: 


 Tonsillitis





Disposition: DC-01 TO HOME OR SELFCARE


Is pt being admited?: No


Does the pt Need Aspirin: No


Condition: Stable


Instructions:  Tonsillitis (ED), Pharyngitis (ED)


Additional Instructions: 


Make sure to follow up with the primary care physician as discussed.


Take all your medications as you've been prescribed.


If you have any worsening symptoms or develop new symptoms please return to ED 

immediately.


Referrals: 


PRIMARY CARE,MD [Primary Care Provider] - 3-5 Days


[facility] [Outside] - 3-5 Days


[facility] [Outside] - 3-5 Days


Forms:  Work/School Release Form(ED)


Time of Disposition: 09:20